# Patient Record
Sex: FEMALE | Race: WHITE | NOT HISPANIC OR LATINO | Employment: FULL TIME | URBAN - METROPOLITAN AREA
[De-identification: names, ages, dates, MRNs, and addresses within clinical notes are randomized per-mention and may not be internally consistent; named-entity substitution may affect disease eponyms.]

---

## 2022-06-24 PROBLEM — R39.89 BLADDER PAIN: Status: ACTIVE | Noted: 2022-06-24

## 2022-10-26 PROBLEM — N20.0 RENAL STONE: Status: ACTIVE | Noted: 2022-10-26

## 2022-12-02 PROBLEM — N13.30 HYDRONEPHROSIS OF RIGHT KIDNEY: Status: ACTIVE | Noted: 2022-12-02

## 2022-12-05 PROBLEM — N20.1 URETERAL STONE: Status: ACTIVE | Noted: 2022-12-05

## 2022-12-05 PROBLEM — E78.5 DYSLIPIDEMIA: Status: ACTIVE | Noted: 2022-12-05

## 2022-12-09 PROBLEM — N13.5 URETERAL STRICTURE: Status: ACTIVE | Noted: 2022-12-09

## 2023-01-04 PROBLEM — R10.11 RIGHT UPPER QUADRANT ABDOMINAL PAIN: Status: ACTIVE | Noted: 2023-01-04

## 2023-02-07 PROBLEM — N30.01 ACUTE CYSTITIS WITH HEMATURIA: Status: ACTIVE | Noted: 2023-02-07

## 2023-02-07 PROBLEM — R10.31 RIGHT LOWER QUADRANT ABDOMINAL PAIN: Status: ACTIVE | Noted: 2023-02-07

## 2023-05-15 PROBLEM — R31.0 GROSS HEMATURIA: Status: ACTIVE | Noted: 2023-05-15

## 2023-07-12 PROBLEM — N20.0 KIDNEY STONE: Status: RESOLVED | Noted: 2022-10-26 | Resolved: 2023-07-12

## 2023-10-26 PROBLEM — R35.1 NOCTURIA: Status: ACTIVE | Noted: 2023-10-26

## 2024-03-06 PROBLEM — N20.1 LEFT URETERAL STONE: Status: RESOLVED | Noted: 2022-12-05 | Resolved: 2024-03-06

## 2024-05-09 PROBLEM — N22 CALCULUS OF URINARY TRACT IN DISEASES CLASSIFIED ELSEWHERE: Status: ACTIVE | Noted: 2022-10-26

## 2024-11-09 ENCOUNTER — NURSE TRIAGE (OUTPATIENT)
Dept: ADMINISTRATIVE | Facility: CLINIC | Age: 49
End: 2024-11-09

## 2024-11-10 NOTE — TELEPHONE ENCOUNTER
"Pt states stent and lithotripsy on Wednesday. Pt c/o sharp pain to left side, trouble urinating, "horrible burning in bladder." Pt states bladder feels full, "something is wrong." Per protocol, go to ED now. Pt verbalizes understanding and advised to call back for any further questions or concerns.   Reason for Disposition   [1] Unable to urinate (or only a few drops) > 4 hours AND [2] bladder feels very full (e.g., palpable bladder or strong urge to urinate)    Additional Information   Negative: Shock suspected (e.g., cold/pale/clammy skin, too weak to stand, low BP, rapid pulse)   Negative: Sounds like a life-threatening emergency to the triager    Protocols used: Urinary Symptoms-A-AH    "